# Patient Record
Sex: MALE | Race: ASIAN | NOT HISPANIC OR LATINO
[De-identification: names, ages, dates, MRNs, and addresses within clinical notes are randomized per-mention and may not be internally consistent; named-entity substitution may affect disease eponyms.]

---

## 2024-05-29 PROBLEM — Z00.00 ENCOUNTER FOR PREVENTIVE HEALTH EXAMINATION: Status: ACTIVE | Noted: 2024-05-29

## 2024-05-30 ENCOUNTER — APPOINTMENT (OUTPATIENT)
Dept: CARDIOLOGY | Facility: CLINIC | Age: 34
End: 2024-05-30
Payer: COMMERCIAL

## 2024-05-30 VITALS
SYSTOLIC BLOOD PRESSURE: 130 MMHG | OXYGEN SATURATION: 97 % | BODY MASS INDEX: 24.82 KG/M2 | TEMPERATURE: 97.1 F | WEIGHT: 160 LBS | DIASTOLIC BLOOD PRESSURE: 87 MMHG | HEART RATE: 81 BPM | HEIGHT: 67.32 IN | RESPIRATION RATE: 18 BRPM

## 2024-05-30 DIAGNOSIS — R07.9 CHEST PAIN, UNSPECIFIED: ICD-10-CM

## 2024-05-30 DIAGNOSIS — Z82.49 FAMILY HISTORY OF ISCHEMIC HEART DISEASE AND OTHER DISEASES OF THE CIRCULATORY SYSTEM: ICD-10-CM

## 2024-05-30 DIAGNOSIS — R00.2 PALPITATIONS: ICD-10-CM

## 2024-05-30 DIAGNOSIS — Z78.9 OTHER SPECIFIED HEALTH STATUS: ICD-10-CM

## 2024-05-30 DIAGNOSIS — Z86.39 PERSONAL HISTORY OF OTHER ENDOCRINE, NUTRITIONAL AND METABOLIC DISEASE: ICD-10-CM

## 2024-05-30 PROCEDURE — 99204 OFFICE O/P NEW MOD 45 MIN: CPT

## 2024-05-30 PROCEDURE — 93306 TTE W/DOPPLER COMPLETE: CPT

## 2024-06-18 ENCOUNTER — APPOINTMENT (OUTPATIENT)
Dept: CARDIOLOGY | Facility: CLINIC | Age: 34
End: 2024-06-18

## 2024-06-19 ENCOUNTER — APPOINTMENT (OUTPATIENT)
Dept: CARDIOLOGY | Facility: CLINIC | Age: 34
End: 2024-06-19
Payer: COMMERCIAL

## 2024-06-19 PROCEDURE — 93015 CV STRESS TEST SUPVJ I&R: CPT

## 2024-06-21 NOTE — ASSESSMENT
[FreeTextEntry1] : 34 year old M with ?HLD and kidney stone who presents for cardiac evaluation.  Pt reports on-and-off chest pain with associated L hand finger numbness that occurs randomly a few times a week for the past year. When it occurs, he feels like it can last from minutes to a whole day. The chest pain is also associated with feelings of SOB. His symptoms are not related to exertion. He also reports that sometimes his heart beat feels irregular but this doesn't occur as often. He recently had a flare up of his kidney stone requiring ED visit. He is on Flomax and he hasn't passed the stone yet. Of note, he was told his cholesterol is always high and was advised to continue dietary/lifestyle changes.  1) CP, in L side, unrelated to exertion, associated with dyspnea. Atypical in nature 2) HLD - EKG 5/17/24 at PCP showed sinus rhythm with iRBBB  - I advised pt to undergo treadmill stress once pt passes his kidney stone; pt did 10:46 min Glenn protocol, did not have CP or SOB and had no ischemic ECG changes - I advised pt to undergo TTE 5/31/24 which showed normal LV and RV function without significant valvular disease. - Pt was reassured that his symptoms are less likely cardiac-related  3) Palpitation, feelings of irregular heartbeat - Check 2 week event monitor. It showed sinus rhythm , avg 83 bpm, very rare PACs and 1 PVC. There was 1 symptom trigger for chest pain, which showed sinus rhythm.  4) Follow-up, as needed

## 2024-06-21 NOTE — HISTORY OF PRESENT ILLNESS
[FreeTextEntry1] : 34 year old M with ?HLD and kidney stone who presents for cardiac evaluation.  Pt reports on-and-off chest pain with associated L hand finger numbness that occurs randomly a few times a week for the past year. When it occurs, he feels like it can last from minutes to a whole day. The chest pain is also associated with feelings of SOB. His symptoms are not related to exertion. He also reports that sometimes his heart beat feels irregular but this doesn't occur as often. He recently had a flare up of his kidney stone requiring ED visit. He is on Flomax and he hasn't passed the stone yet. Of note, he was told his cholesterol is always high and was advised to continue dietary/lifestyle changes.